# Patient Record
(demographics unavailable — no encounter records)

---

## 2019-07-31 NOTE — XRAY
Indication: Ganglion nerve block.



Intraoperative fluoroscopy was provided for 18 seconds.  Single digital spot

image submitted for interpretation demonstrates posterior needle tip

projecting just anterior to the sacrococcygeal junction.  Small amount of

contrast injected for needle tip placement.  Correlate with intraoperative

findings/report.

## 2019-09-12 NOTE — XRAY
Indication: Ganglia impar nerve block.



Intraoperative fluoroscopy was provided for 15 seconds.  A single lateral

digital spot film was obtained.  The needle tip is seen projected over the

anterior aspect of the proximal coccyx.  Some contrast material has been

injected for needle tip placement.  Correlate with intraoperative

findings/report.